# Patient Record
Sex: FEMALE | Race: WHITE | URBAN - METROPOLITAN AREA
[De-identification: names, ages, dates, MRNs, and addresses within clinical notes are randomized per-mention and may not be internally consistent; named-entity substitution may affect disease eponyms.]

---

## 2018-07-07 ENCOUNTER — NURSE ONLY (OUTPATIENT)
Dept: FAMILY MEDICINE CLINIC | Facility: CLINIC | Age: 73
End: 2018-07-07

## 2018-07-07 VITALS
OXYGEN SATURATION: 97 % | DIASTOLIC BLOOD PRESSURE: 84 MMHG | RESPIRATION RATE: 16 BRPM | HEART RATE: 74 BPM | SYSTOLIC BLOOD PRESSURE: 130 MMHG | TEMPERATURE: 98 F | WEIGHT: 238 LBS

## 2018-07-07 DIAGNOSIS — F41.9 ANXIETY: ICD-10-CM

## 2018-07-07 DIAGNOSIS — R42 DIZZINESS: Primary | ICD-10-CM

## 2018-07-07 DIAGNOSIS — R42 INTERMITTENT LIGHTHEADEDNESS: ICD-10-CM

## 2018-07-07 LAB
GLUCOSE BLOOD: 94
TEST STRIP LOT #: NORMAL NUMERIC

## 2018-07-07 PROCEDURE — 82962 GLUCOSE BLOOD TEST: CPT | Performed by: PHYSICIAN ASSISTANT

## 2018-07-07 NOTE — PROGRESS NOTES
CHIEF COMPLAINT:     Patient presents with:  Dizziness: started 2 days ago  :      HPI:     Jannette Mclain is a 72year old female presents with complaints of dizziness and intermittent lightheadedness for 2 days, presents with daughter who palpitations  LUNGS: Denies shortness of breath, cough, or wheezing  GI: Denies abdominal pain, no nausea   NEURO: no headaches, some lightheadedness.   No seizures, no confusion, no weakness, no abnormal sensation    EXAM:   /84   Pulse 74   Temp 98